# Patient Record
Sex: FEMALE | Race: WHITE | NOT HISPANIC OR LATINO | URBAN - METROPOLITAN AREA
[De-identification: names, ages, dates, MRNs, and addresses within clinical notes are randomized per-mention and may not be internally consistent; named-entity substitution may affect disease eponyms.]

---

## 2017-08-10 ENCOUNTER — OFFICE VISIT - RIVER FALLS (OUTPATIENT)
Dept: FAMILY MEDICINE | Facility: CLINIC | Age: 22
End: 2017-08-10

## 2017-08-10 ASSESSMENT — MIFFLIN-ST. JEOR: SCORE: 1351.42

## 2018-03-22 ENCOUNTER — OFFICE VISIT - RIVER FALLS (OUTPATIENT)
Dept: FAMILY MEDICINE | Facility: CLINIC | Age: 23
End: 2018-03-22

## 2018-03-22 ASSESSMENT — MIFFLIN-ST. JEOR: SCORE: 1348.7

## 2018-04-23 ENCOUNTER — OFFICE VISIT - RIVER FALLS (OUTPATIENT)
Dept: FAMILY MEDICINE | Facility: CLINIC | Age: 23
End: 2018-04-23

## 2018-04-23 ASSESSMENT — MIFFLIN-ST. JEOR: SCORE: 1321.49

## 2018-11-15 ENCOUNTER — OFFICE VISIT - RIVER FALLS (OUTPATIENT)
Dept: FAMILY MEDICINE | Facility: CLINIC | Age: 23
End: 2018-11-15

## 2018-11-15 ASSESSMENT — MIFFLIN-ST. JEOR: SCORE: 1489.32

## 2022-02-11 VITALS
TEMPERATURE: 97.6 F | WEIGHT: 172 LBS | BODY MASS INDEX: 30.48 KG/M2 | HEIGHT: 63 IN | OXYGEN SATURATION: 98 % | SYSTOLIC BLOOD PRESSURE: 124 MMHG | DIASTOLIC BLOOD PRESSURE: 80 MMHG | HEART RATE: 90 BPM

## 2022-02-11 VITALS
HEART RATE: 68 BPM | DIASTOLIC BLOOD PRESSURE: 62 MMHG | HEIGHT: 63 IN | TEMPERATURE: 98.9 F | SYSTOLIC BLOOD PRESSURE: 112 MMHG | WEIGHT: 141.6 LBS | BODY MASS INDEX: 25.09 KG/M2

## 2022-02-11 VITALS
SYSTOLIC BLOOD PRESSURE: 114 MMHG | DIASTOLIC BLOOD PRESSURE: 70 MMHG | WEIGHT: 135 LBS | BODY MASS INDEX: 24.98 KG/M2 | TEMPERATURE: 99.7 F | HEART RATE: 72 BPM | HEIGHT: 63 IN | HEIGHT: 63 IN | TEMPERATURE: 99.1 F | WEIGHT: 141 LBS | BODY MASS INDEX: 23.92 KG/M2

## 2022-02-15 NOTE — PROGRESS NOTES
Patient:   MARYCARMEN TRACY            MRN: [id number]            FIN: [id number]               Age:   22 years     Sex:  Female     :  1995   Associated Diagnoses:   Soft tissue mass; Yeast vaginitis   Author:   Morgan Yates PA-C      Visit Information   Visit type:  General concerns.    Accompanied by:  Family member.    Source of history:  Self, Family member, Medical record.    History limitation:  None.       Chief Complaint   8/10/2017 2:48 PM CDT    Pt. here for rash in private area, and lump on right lower leg.      History of Present Illness             The patient presents with vaginitis.  The vaginitis is characterized by discharge, itching, redness and irritation.  The severity of the vaginitis symptom(s) is moderate.  The symptom(s) of vaginitis is constant.  The vaginitis symptom(s) has lasted for 1 week(s).  The context of the vaginitis: occurred not after intercourse and not in association with pregnancy.  Just finished menses. Noticed swelling on right labia. CC above noted and confirmed with the patient. Uses Vagisil. Check lump right lower extremity. Painful at times. No injury..        Review of Systems   Constitutional:  Negative.    Genitourinary:  Negative.    Gynecologic:  Negative except as documented in history of present illness.    Musculoskeletal:  Negative except as documented in history of present illness.    Integumentary:  Negative except as documented in history of present illness.    Neurologic:  Negative.       Health Status   Allergies:    Allergic Reactions (All)  No Known Medication Allergies      Histories   Past Medical History:    No active or resolved past medical history items have been selected or recorded.   Family History:    Diabetes mellitus type II  Father (Ej)     Procedure history:    No active procedure history items have been selected or recorded.   Social History:        Alcohol Assessment: Denies Alcohol Use      Tobacco Assessment: Denies Tobacco  Use      Substance Abuse Assessment: Denies Substance Abuse      Employment and Education Assessment            Employed, Work/School description: .      Nutrition and Health Assessment            Type of diet: Regular.      Exercise and Physical Activity Assessment            Exercise type: Walking.        Physical Examination   Vital Signs   8/10/2017 2:48 PM CDT Temperature Tympanic 98.9 DegF    Peripheral Pulse Rate 68 bpm    Pulse Site Radial artery    HR Method Manual    Systolic Blood Pressure 112 mmHg    Diastolic Blood Pressure 62 mmHg    Mean Arterial Pressure 79 mmHg    BP Site Right arm    BP Method Manual      Genitourinary:  Exam with Laurie Bo present.         Labia: Right, Majora, Edema, Erythema, Mass ( White discharge noted. ).    Musculoskeletal:  Normal range of motion, Normal strength, No deformity, Area of soft tissue fullness to lateral aspect of right lower leg..    Integumentary:  No rash.       Impression and Plan   Diagnosis     Soft tissue mass (MFF71-LC M79.9).     Yeast vaginitis (WTK60-AL B37.3).     Patient Instructions:       Counseled: Patient, Family, Regarding diagnosis, Regarding treatment, Regarding medications, Activity, Verbalized understanding.    Orders     Orders (Selected)   Outpatient Orders  Ordered  Referral (Request): 08/10/17 15:01:00 CDT, Referred to: Radiology, Referred to: [facility] per family request US of soft tissue right lower extremity. Soft tissue mass distal lateral right leg., Priority: Routine, Soft tissue mass  Prescriptions  Prescribed  clotrimazole 1% topical cream: 1 valdo, TOP, BID, # 30 g, 0 Refill(s), Type: Maintenance, Pharmacy: [facility], 1 valdo top bid.     FU after testing. FU if vaginal symptoms persist. Local cares.

## 2022-02-15 NOTE — PROGRESS NOTES
Patient:   MARYCARMEN TRACY            MRN: [id number]            FIN: [id number]               Age:   22 years     Sex:  Female     :  1995   Associated Diagnoses:   Impacted cerumen   Author:   Teodoro Sawyer MD      Impression and Plan   Diagnosis     Impacted cerumen (OLE07-WH H61.23).     Orders     Orders (Selected)   Outpatient Orders  Order  [id number] rmvl impacted cerumen spx 1/both ears (Charge): Quantity: 1, Impacted cerumen.        Procedure   Ear foreign body removal procedure   Date/ Time:  3/22/2018 4:03:00 PM.     Confirmed: patient, procedure, side, safety procedures followed.     Performed by: Teodoro Sawyer MD.     Informed consent: verbal consent given by patient.     Indication: ear fullness, hearing disturbance.     Location: left ear, right ear.     Preparation and technique: positioned sitting upright, method including (cerumen loop, irrigation with warm tap water, otologic syringe).     Results: foreign body removal complete.     Procedure tolerated: well.

## 2022-02-15 NOTE — PROGRESS NOTES
Patient:   MARYCARMEN TRACY            MRN: [id number]            FIN: [id number]               Age:   23 years     Sex:  Female     :  1995   Associated Diagnoses:   Common cold   Author:   Khadijah Tubbs      Chief Complaint   11/15/2018 2:48 PM CST   c/o productive cough, sore throat, congestion x 2 weeks; currently 33 weeks pregnant        History of Present Illness             The patient presents with symptoms of an upper respiratory infection and lives with parents, mom has had a cold  thought it was just that but coughing up tan drainage this morning, lots of sinus pressure and scratchy throat  following with OB provider, scheduled next week for flu shot.  The symptoms of the upper respiratory infection are described as rhinorrhea, sore throat, nasal congestion and cough.  The severity of the symptoms associated to the upper respiratory infection is moderate.  The timing/course of upper respiratory infection symptoms is worsening.  The symptoms of upper respiratory infection have lasted for 2 week(s).  The context of the upper respiratory infection symptoms: occurred in association with illness and took some robitussin.  Relieving factors consist of medication and fluids.        Review of Systems   Constitutional:  No fever, No chills.    Ear/Nose/Mouth/Throat:  Nasal congestion, No ear pain.    Respiratory:  Cough, No shortness of breath.       Health Status   Allergies:    Allergic Reactions (Selected)  No Known Medication Allergies   Medications:  (Selected)   Prescriptions  Prescribed  amoxicillin 875 mg oral tablet: = 1 tab(s) ( 875 mg ), PO, BID, # 20 tab(s), 0 Refill(s), Type: Maintenance  Documented Medications  Documented  Prenatal Multivitamins: Oral, daily, 0 Refill(s), Type: Maintenance   Problem list:    All Problems  Obesity / SNOMED CT [id number] / Probable      Histories   Past Medical History:    No active or resolved past medical history items have been selected or recorded.    Family History:    Diabetes mellitus type II  Father (Ej)     Procedure history:    No active procedure history items have been selected or recorded.   Social History:        Alcohol Assessment: Denies Alcohol Use      Tobacco Assessment: Denies Tobacco Use      Substance Abuse Assessment: Denies Substance Abuse      Employment and Education Assessment            Employed, Work/School description: .      Nutrition and Health Assessment            Type of diet: Regular.      Exercise and Physical Activity Assessment            Exercise type: Walking.        Physical Examination   Vital Signs   11/15/2018 2:48 PM CST Temperature Tympanic 97.6 DegF  LOW    Peripheral Pulse Rate 90 bpm    Pulse Site Radial artery    HR Method Electronic    Systolic Blood Pressure 124 mmHg    Diastolic Blood Pressure 80 mmHg    Mean Arterial Pressure 95 mmHg    BP Site Right arm    BP Method Manual    Oxygen Saturation 98 %      Measurements from flowsheet : Measurements   11/15/2018 2:48 PM CST Height Measured - Standard 63 in    Weight Measured - Standard 172 lb    BSA 1.86 m2    Body Mass Index 30.47 kg/m2  HI      General:  Alert and oriented, No acute distress.    Eye:  Normal conjunctiva.    HENT:  Tympanic membranes are clear, Normal hearing, Oral mucosa is moist, No pharyngeal erythema.    Neck:  Supple, Non-tender, No lymphadenopathy.    Respiratory:  Lungs are clear to auscultation, Respirations are non-labored, Breath sounds are equal, Symmetrical chest wall expansion.    Cardiovascular:  Normal rate, Regular rhythm, No murmur.    Integumentary:  Warm, Dry, Pink, No rash.    Neurologic:  Alert, Oriented.    Psychiatric:  Cooperative, Appropriate mood & affect.       Impression and Plan   Diagnosis     Common cold (YHE80-BQ J00).     Patient Instructions:       Counseled: Patient, Regarding diagnosis, Regarding treatment, Regarding medications, Verbalized understanding, Use  saline nasal spray and salt water  gargles and tylenol. If not improving in 3-4 days, fill rx for amox and treat as baterial sinus infection, f/u with OB provider next week as planned.    Orders     Orders (Selected)   Prescriptions  Prescribed  amoxicillin 875 mg oral tablet: = 1 tab(s) ( 875 mg ), PO, BID, # 20 tab(s), 0 Refill(s), Type: Maintenance.

## 2022-02-15 NOTE — LETTER
(Inserted Image. Unable to display)   April 24, 2019      MARYCARMEN TRACY  [address], [place] [id number]        Dear MARYCARMEN,      Thank you for selecting [facility] (previously [facility]) for your healthcare needs.     Our records indicate you are due for the following services:     Annual Physical  Medication Check    To schedule an appointment or if you have further questions, please contact your primary clinic:   [facility]          [phone]   [facility]    [phone]             [facility]         [phone]      Powered by [employer] and [email or web address]    Sincerely,    Morgan Yates PA-C

## 2022-02-15 NOTE — PROGRESS NOTES
Patient:   MARYCARMEN TRACY            MRN: [id number]            FIN: [id number]               Age:   22 years     Sex:  Female     :  1995   Associated Diagnoses:   Hordeolum externum (stye); Oral contraceptive use   Author:   Morgan Yates PA-C      Report Summary   Diagnosis  Hordeolum externum (stye) (RCC45-OI H00.014).  Patient InstructionsOrders   Visit Information   Visit type:  General concerns.    Accompanied by:  No one.    Source of history:  Self.    Referral source:  Self.    History limitation:  None.       Chief Complaint   2018 3:49 PM CDT    c/o L eye puffiness, painful to blink x this morning; discuss birth control options        History of Present Illness             The patient presents with eye pain.  The location of the eye pain is the left eye.  The eye pain is described as aching.  The severity of the eye pain is mild.  The eye pain is constant.  The eye pain has lasted for 1 day(s).  Mild pain and swelling left upper lid. No FB sensation. Denies trauma. Wears glasses. Vision is fine. No URI symptoms. Discuss birth control options. CC above noted and confirmed with the patient. Exam up to date. Needs Pap next year. Menses about every 28 days and last about 6 days. Moderately heavy days 1-2. Some cramps. Due this week. Sexually active. Using foams. .        Review of Systems   Constitutional:  Negative.    Eye:  Negative except as documented in history of present illness.    Genitourinary:  Negative except as documented in history of present illness.       Health Status   Allergies:    Allergic Reactions (All)  No Known Medication Allergies   Medications:  (Selected)   Prescriptions  Prescribed  Ortho-Novum  oral tablet: 1 tab(s), PO, Daily, # 28 tab(s), 11 Refill(s), Type: Maintenance, Pharmacy: [id number], 1 tab(s) po daily  erythromycin 0.5% ophthalmic ointment: 0.5 in, left eye, tid, # 3.5 gm, 0 Refill(s), Type: Maintenance, Pharmacy: [id number], 0.5  in left eye tid,x7 day(s)      Histories   Past Medical History:    No active or resolved past medical history items have been selected or recorded.      Physical Examination   Vital Signs   4/23/2018 3:49 PM CDT Temperature Tympanic 99.7 DegF    Peripheral Pulse Rate 72 bpm    Pulse Site Radial artery    HR Method Manual    Systolic Blood Pressure 114 mmHg    Diastolic Blood Pressure 70 mmHg    Mean Arterial Pressure 85 mmHg    BP Site Left arm    BP Method Manual      Measurements from flowsheet : Measurements   4/23/2018 3:49 PM CDT Height Measured - Standard 63 in    Weight Measured - Standard 135 lb    BSA 1.65 m2    Body Mass Index 23.91 kg/m2      Eye:  Pupils are equal, round and reactive to light, Extraocular movements are intact, Normal conjunctiva.         Eyelids: Left, Upper, Edematous, Erythema, Small hordeolum left lateral upper lid., Not with foreign body present.       Impression and Plan   Diagnosis     Hordeolum externum (stye) (ZVP85-EN H00.014).     Oral contraceptive use (EVY62-RI Z30.41).     Patient Instructions:       Counseled: Patient, Regarding diagnosis, Regarding treatment, Regarding medications, Activity, Verbalized understanding.    Orders     Orders (Selected)   Prescriptions  Prescribed  Ortho-Novum 7/7/7 oral tablet: 1 tab(s), PO, Daily, # 28 tab(s), 11 Refill(s), Type: Maintenance, Pharmacy: [email or web address] #[id number], 1 tab(s) po daily  erythromycin 0.5% ophthalmic ointment: 0.5 in, left eye, tid, # 3.5 gm, 0 Refill(s), Type: Maintenance, Pharmacy: [email or web address] #[id number], 0.5 in left eye tid,x7 day(s).     Warm compress to left eye BID. Return if pain, decreased vision, or if not better in one week. No contraindications to oral contraception. Discussed other options such as Depo-Provera, implants, IUDs and patches. She wants to try oral contraceptives. Wait for Sunday start with next menses. Possible side effects discussed. Emergent visit for leg swelling, erythema, or pain, or any SOB  or chest pain. BP check four weeks after start.